# Patient Record
Sex: FEMALE | Race: WHITE | Employment: OTHER | URBAN - METROPOLITAN AREA
[De-identification: names, ages, dates, MRNs, and addresses within clinical notes are randomized per-mention and may not be internally consistent; named-entity substitution may affect disease eponyms.]

---

## 2017-02-01 NOTE — PROGRESS NOTES
[place]    Chief Complaint   Patient presents with     RECHECK       HPI:    Arpita Martinez is a 83 year old  (4/26/1933), who is being seen today for an episodic care visit at [facility] .     Today's concern is:     MS (multiple sclerosis) (H)  Traumatic ischemia of muscle, sequela  Osteoporosis  Frequent falls  Pain  Dementia with behavioral disturbance, unspecified dementia type     Met with patient today, seen earlier having cookies and coffee with facility residents in community room, very pleasant, WC bound due to MS, R hand 50%  strength, L hand 30%  strength, able to reposition self in WC, continent, uses call button for assist most of time for toileting, attempts to be independent place at slight risk for falling, no new complaints, overall stable cognitive status with slight decline in physical abilities.     ALLERGIES: Review of patient's allergies indicates no known allergies.  Past Medical, Surgical, Family and Social History reviewed and updated in [facility].    Current Outpatient Prescriptions   Medication Sig Dispense Refill     alendronate (FOSAMAX) 70 MG tablet Take 1 tablet (70 mg) by mouth once a week 4 tablet 11     sennosides (SENOKOT) 8.6 MG tablet Take 2 tablets by mouth 2 times daily as needed for constipation       acetaminophen (TYLENOL) 500 MG tablet Take 500-1,000 mg by mouth every 6 hours as needed for mild pain       donepezil (ARICEPT) 5 MG tablet Take 1 tablet (5 mg) by mouth daily 31 tablet PRN     Multiple Vitamins-Minerals (WOMENS ONE DAILY) TABS Take 1 tablet by mouth daily 31 tablet PRN       REVIEW OF SYSTEMS:  4 point ROS including Respiratory, CV, GI and , other than that noted in the HPI,  is negative    Physical Exam:  /80 mmHg  Pulse 83  Temp(Src) 99  F (37.2  C)  Resp 18  Wt 102 lb 12.8 oz (46.63 kg)  GENERAL APPEARANCE:  Alert, in no distress, thin, happy  ENT:  Mouth and posterior oropharynx normal, moist mucous membranes,  [place]  RESP:  lungs clear to auscultation, no respiratory distress, sats >90%  CV:  Palpation and auscultation of heart done, regular rate and rhythm  ABDOMEN:  no guarding or rebound, bowel sounds normal  M/S:   Gait and station abnormal requires WC for safety, difficult to transfer/requires assist, moderate strength upper extremities, hand contractures L>R from RA, fine motor coordination intact R hand  SKIN:  misc forearm and hand bruising  NEURO:   Examination of sensation by touch normal  PSYCH:  oriented to self, safe with supportive environment    Recent Labs:      CBC RESULTS:   Recent Labs   Lab Test 08/04/16 05/25/16   WBC  4.8  2.1   RBC  4.20  3.34   HGB  12.8  10.7*   HCT  38.9  32.5   MCV  93  97   MCH  30.5  32   MCHC  32.9  32.9   RDW  13.2  15.3   PLT  334  438       Last Basic Metabolic Panel:  Recent Labs   Lab Test 08/04/16 05/16/16   NA  132  136   POTASSIUM  4.1  3.5   CHLORIDE  97  108   NII  9.7  7.7   BUN  14  11   CR  0.6  0.51   GLC  113*  92           Assessment/Plan:  MS (multiple sclerosis) (H)  Traumatic ischemia of muscle, sequela  -chronic condition, requires assist for transfer, probable WC bound  -[facility] PT/OT to continue working with  -ordered electronic wheelchair, unsure of arrival date; [facility] has specs and F2F for already    Osteoporosis  Frequent falls  Pain  -mainly hands are affected  -appreciate therapy and ability to work with  -continues tylenol for pain control  -continues fosamax weekly  -facility to monitor    Dementia with behavioral disturbance, unspecified dementia type  -most recent SLUMs 18/30, moderate dementia  -continue on aricept 5mg  -no new behaviors or issues per staff except for falls      Electronically signed by  CAMILLA Duarte CNP          Documentation of Face-to-Face and Certification for Home Health Services     Patient: Arpita Martinez   YOB: 1933  MR Number: [id number]  Today's Date: 2/2/2017      If you do not hear  from Home Care and Hospice, or you would like to call to schedule, please call the referring place of service that your provider has listed below.  ______________________________________________________________________    Your provider has referred you to: [facility]: [facility] [phone]   [email or web address]    Extended Emergency Contact Information  Primary Emergency Contact: Mary Mary  Address: [address] [place] [address]           [place] [address] [facility]  Mobile Phone: [phone]  Relation: Daughter  Secondary Emergency Contact: Zachary Martinez   [place]  Mobile Phone: [phone]  Relation: Son    Patient Anticipated Discharge Date: PATRICIA   RN, PT, HHA to begin 24 - 48 hours after discharge.  PLEASE EVALUATE AND TREAT (Evaluation timeline is 24 - 48 hrs. Please call if there is need for a variance to this timeline).    REASON FOR REFERRAL: Assessment & Treatment: PT    ADDITIONAL SERVICES NEEDED: OT    OTHER PERTINENT INFORMATION: Patient was last seen by provider on 2/2/17 for    MS (multiple sclerosis) (H)  Traumatic ischemia of muscle, sequela  Osteoporosis  Frequent falls  Pain  Dementia with behavioral disturbance, unspecified dementia type    Current Outpatient Prescriptions:  alendronate (FOSAMAX) 70 MG tablet, Take 1 tablet (70 mg) by mouth once a week, Disp: 4 tablet, Rfl: 11  sennosides (SENOKOT) 8.6 MG tablet, Take 2 tablets by mouth 2 times daily as needed for constipation, Disp: , Rfl:   acetaminophen (TYLENOL) 500 MG tablet, Take 500-1,000 mg by mouth every 6 hours as needed for mild pain, Disp: , Rfl:   donepezil (ARICEPT) 5 MG tablet, Take 1 tablet (5 mg) by mouth daily, Disp: 31 tablet, Rfl: PRN  Multiple Vitamins-Minerals (WOMENS ONE DAILY) TABS, Take 1 tablet by mouth daily, Disp: 31 tablet, Rfl: PRN      Patient Active Problem List:     Fx Tibia/Fibula NOS-Closed, RIGHT     Status post total knee replacement      CARDIOVASCULAR SCREENING; LDL GOAL LESS THAN 160     Traumatic ischemia of muscle, sequela     Osteoporosis     Pain     Constipation, unspecified constipation type     Dementia with behavioral disturbance, unspecified dementia type     Hyponatremia     Anemia, unspecified type     Frequent falls     Dementia without behavioral disturbance, unspecified dementia type     MS (multiple sclerosis) (H)      Documentation of Face to Face and Certification for Home Health Services    I certify that patient, Arpita Martinez is under my care and that I, or a Nurse Practitioner or Physician's Assistant working with me, had a face-to-face encounter that meets the physician face-to-face encounter requirements with this patient on: 2/2/2017.    This encounter with the patient was in whole, or in part, for the following medical condition, which is the primary reason for Home Health Care: MS/weakness.    I certify that, based on my findings, the following services are medically necessary Home Health Services: Occupational Therapy and Physical Therapy    My clinical findings support the need for the above services because: Occupational Therapy Services are needed to assess and treat cognitive ability and address ADL safety due to impairment in muscle coordination, falls, risk, safety. and Physical Therapy Services are needed to assess and treat the following functional impairments: fine motor skills, ADL independence.    Further, I certify that my clinical findings support that this patient is homebound (i.e. absences from home require considerable and taxing effort and are for medical reasons or [employer] services or infrequently or of short duration when for other reasons) because: Requires assistance of another person or specialized equipment to access medical services because patient: Requires supervision of another for safe transfer..    Based on the above findings, I certify that this patient is confined to the home and needs  intermittent skilled nursing care, physical therapy and/or speech therapy.  The patient is under my care, and I have initiated the establishment of the plan of care.  This patient will be followed by a physician who will periodically review the plan of care.    Physician/Provider to provide follow up care: Tobi Jennings    [facility] certified Physician at time of discharge: Dr. Hailee Osborne  Electronic Physician Signature:   Date: 2/2/2017

## 2017-02-02 ENCOUNTER — ASSISTED LIVING VISIT (OUTPATIENT)
Dept: GERIATRICS | Facility: CLINIC | Age: 82
End: 2017-02-02
Payer: COMMERCIAL

## 2017-02-02 VITALS
SYSTOLIC BLOOD PRESSURE: 137 MMHG | RESPIRATION RATE: 18 BRPM | DIASTOLIC BLOOD PRESSURE: 80 MMHG | TEMPERATURE: 99 F | WEIGHT: 102.8 LBS | HEART RATE: 83 BPM

## 2017-02-02 DIAGNOSIS — T79.6XXS TRAUMATIC ISCHEMIA OF MUSCLE, SEQUELA: ICD-10-CM

## 2017-02-02 DIAGNOSIS — F03.91 DEMENTIA WITH BEHAVIORAL DISTURBANCE, UNSPECIFIED DEMENTIA TYPE: ICD-10-CM

## 2017-02-02 DIAGNOSIS — M81.0 OSTEOPOROSIS: ICD-10-CM

## 2017-02-02 DIAGNOSIS — G35 MS (MULTIPLE SCLEROSIS) (H): Primary | ICD-10-CM

## 2017-02-02 DIAGNOSIS — R29.6 FREQUENT FALLS: ICD-10-CM

## 2017-02-02 DIAGNOSIS — R52 PAIN: ICD-10-CM

## 2017-02-02 PROCEDURE — 99207 ZZC CDG-CORRECTLY CODED, REVIEWED AND AGREE: CPT | Performed by: NURSE PRACTITIONER

## 2017-04-06 ENCOUNTER — ASSISTED LIVING VISIT (OUTPATIENT)
Dept: GERIATRICS | Facility: CLINIC | Age: 82
End: 2017-04-06
Payer: COMMERCIAL

## 2017-04-06 DIAGNOSIS — R22.0 MOUTH SWELLING: ICD-10-CM

## 2017-04-06 DIAGNOSIS — K13.79 MOUTH PAIN: ICD-10-CM

## 2017-04-06 DIAGNOSIS — S09.93XS DENTAL INJURY, SEQUELA: Primary | ICD-10-CM

## 2017-04-06 RX ORDER — LOPERAMIDE HCL 2 MG
4 CAPSULE ORAL ONCE
COMMUNITY

## 2017-04-06 NOTE — PROGRESS NOTES
"[place]    Chief Complaint   Patient presents with     RECHECK       HPI:    Arpita Martinez is a 83 year old  (4/26/1933), who is being seen today for an episodic care visit at [facility] .       Today's concern is:  Dental injury, sequela  Mouth swelling  Mouth pain    Patient presented earlier this week with swelling and pain on L side of mouth and lower jaw, considered probable tissue infection due to dentition, started on cephalexin to prevent acute infection until could be seen by dentist, had appt on 4/5/17 with [facility] at the [place] location via family, S/p root canal, cephalexin was D/C'd in favor of clindamycin by dentist, current status is continued pain in L lower jaw although patient did go down for coffee/cookies as usual earlier today, no s/s dry socket,  overall stable with swelling decreased, intake appropriate, patient states \"I am going to live\".       ALLERGIES: Review of patient's allergies indicates no known allergies.  Past Medical, Surgical, Family and Social History reviewed and updated in [facility].    Current Outpatient Prescriptions   Medication Sig Dispense Refill     CLINDAMYCIN HCL PO Take 300 mg by mouth 4 times daily       loperamide (IMODIUM) 2 MG capsule Take 2 mg by mouth 3 times daily as needed for diarrhea       alendronate (FOSAMAX) 70 MG tablet Take 1 tablet (70 mg) by mouth once a week 4 tablet 11     sennosides (SENOKOT) 8.6 MG tablet Take 2 tablets by mouth 2 times daily as needed for constipation       acetaminophen (TYLENOL) 500 MG tablet Take 500-1,000 mg by mouth every 6 hours as needed for mild pain       donepezil (ARICEPT) 5 MG tablet Take 1 tablet (5 mg) by mouth daily 31 tablet PRN     Multiple Vitamins-Minerals (WOMENS ONE DAILY) TABS Take 1 tablet by mouth daily 31 tablet PRN     Medications reconciled to facility chart and changes were made to reflect current medications as identified as above med list. Below are " the changes that were made:   Medications stopped since last EPIC medication reconciliation:   There are no discontinued medications.    Medications started since last [facility] medication reconciliation:  Orders Placed This Encounter   Medications     CLINDAMYCIN HCL PO     Sig: Take 300 mg by mouth 4 times daily     loperamide (IMODIUM) 2 MG capsule     Sig: Take 2 mg by mouth 3 times daily as needed for diarrhea       REVIEW OF SYSTEMS:  4 point ROS including Respiratory, CV, GI and , other than that noted in the HPI,  is negative    Physical Exam:  There were no vitals taken for this visit.  GENERAL APPEARANCE:  Alert, in no distress, thin  ENT:  Mouth and posterior oropharynx normal, moist mucous membranes, normal hearing acuity  RESP:  lungs clear to auscultation , no respiratory distress  CV:  Palpation and auscultation of heart done , regular rate and rhythm, no murmur, rub, or gallop  ABDOMEN:  no guarding or rebound, bowel sounds normal  M/S:   Gait and station abnormal WC bound, weak  SKIN:  Inspection of skin and subcutaneous tissue baseline  NEURO:   Examination of sensation by touch normal  PSYCH:  oriented X 3    Recent Labs:      CBC RESULTS:   Recent Labs   Lab Test 08/04/16 05/25/16   WBC  4.8  2.1   RBC  4.20  3.34   HGB  12.8  10.7*   HCT  38.9  32.5   MCV  93  97   MCH  30.5  32   MCHC  32.9  32.9   RDW  13.2  15.3   PLT  334  438       Last Basic Metabolic Panel:  Recent Labs   Lab Test 08/04/16 05/16/16   NA  132  136   POTASSIUM  4.1  3.5   CHLORIDE  97  108   NII  9.7  7.7   BUN  14  11   CR  0.6  0.51   GLC  113*  92           Assessment/Plan:  Dental injury, sequela  Mouth swelling  Mouth pain  -Root canal on 4/5/17, started on clindamycin for infection prophylaxis  -patient seems to have tolerated procedure well  -has saline swish/spit as ordered  -to follow up with primary dentis on 4/14/17 for permanent restoration or crown    Orders:  No new orders at this time. Continue current plan of  care until status changes.    Electronically signed by  CAMILLA Duarte CNP

## 2017-05-26 ENCOUNTER — TRANSFERRED RECORDS (OUTPATIENT)
Dept: HEALTH INFORMATION MANAGEMENT | Facility: CLINIC | Age: 82
End: 2017-05-26

## 2017-05-31 NOTE — PROGRESS NOTES
[facility]  Chief Complaint   Patient presents with     Annual Comprehensive Nursing Home       HPI:    Arpita Martinez is a 84 year old  (4/26/1933), who is being seen today for an annual comprehensive visit at [facility] .     Today's concerns are:  Urinary tract infection, site unspecified  Nitrofurantion 100mg po BID started 5/30 x 5 days, until 6/4. Currently no s/s dysuria, afebrile.    Dementia with behavioral disturbance, unspecified dementia type  Increased behaviors, resistant to cares, refused to be changed when soiled, very opinionated. Current regimen Donepezil 5mg po qhs.     Impacted cerumen, unspecified laterality  Patient reported difficulty hearing, ears checked, copious amounts of firm cerumen blocking bilateral ear canals at >90%, removed today.    MS (multiple sclerosis) (H)  Requires transfer assist, still attends many facility activities including exercise daily, stable.    Screening for depression  Depression screen done: PHQ-2 Given screen score and clinical assessment patient is stable without any signs of depression and no futher interventions warrented at this time.      Screening for hypertension  Based on JNC-8 goals,  patients age of 84 year old, no presence of diabetes or CKD, and goals of care goal BP is  <140/90 mm Hg. patient is stable and continue without pharmacological invention with routine assessment.  BP readings range:  122/72  195/94  153/102  137/80  123/69  131/70       Osteoporosis  Current regimen Fosamax 70mg po q7days.       ALLERGIES: Review of patient's allergies indicates no known allergies.  PROBLEM LIST:  Patient Active Problem List   Diagnosis     Fx Tibia/Fibula NOS-Closed, RIGHT     Status post total knee replacement     CARDIOVASCULAR SCREENING; LDL GOAL LESS THAN 160     Traumatic ischemia of muscle, sequela     Osteoporosis     Pain     Constipation, unspecified constipation type     Dementia with behavioral disturbance, unspecified  dementia type     Hyponatremia     Anemia, unspecified type     Frequent falls     Dementia without behavioral disturbance, unspecified dementia type     MS (multiple sclerosis) (H)     PAST MEDICAL HISTORY:  has no past medical history on file.  PAST SURGICAL HISTORY:  has a past surgical history that includes TOTAL KNEE ARTHROPLASTY.  FAMILY HISTORY: family history is not on file.  SOCIAL HISTORY:    IMMUNIZATIONS:  Most Recent Immunizations   Administered Date(s) Administered     Influenza (High Dose) 3 valent vaccine 10/05/2016     Pneumococcal (PCV 13) 11/01/2016     Tdap (Adacel,Boostrix) 04/19/2011   Deferred Date(s) Deferred     Pneumococcal (PCV 13) 10/25/2016     Above immunizations pulled from [employer]. [facility] and facility records also reconciled. Outstanding information sent to  to update [employer].  Future immunizations needed:  yearly influenza per facility protocol  MEDICATIONS:  Current Outpatient Prescriptions   Medication Sig Dispense Refill     NITROFURANTOIN MONOHYD MACRO PO Take 100 mg by mouth 2 times daily       loperamide (IMODIUM) 2 MG capsule Take 2 mg by mouth 3 times daily as needed for diarrhea       alendronate (FOSAMAX) 70 MG tablet Take 1 tablet (70 mg) by mouth once a week 4 tablet 11     sennosides (SENOKOT) 8.6 MG tablet Take 2 tablets by mouth 2 times daily as needed for constipation       acetaminophen (TYLENOL) 500 MG tablet Take 500-1,000 mg by mouth every 6 hours as needed for mild pain       donepezil (ARICEPT) 5 MG tablet Take 1 tablet (5 mg) by mouth daily 31 tablet PRN     Multiple Vitamins-Minerals (WOMENS ONE DAILY) TABS Take 1 tablet by mouth daily 31 tablet PRN       Case Management:  I have reviewed the Assisted Living care plan, current immunizations and preventive care/cancer screening.. Future cancer screening is not clinically indicated secondary to age/goals of care.   Patient's desire to return to the community is present, but is not able  due to care needs .    Advance Directive Discussion:    I reviewed the current advanced directives as reflected in EPIC and the facility chart.  plan of Care. I reviewed the POLST, resigned, dated and sent to [facility].  I did not due to cognitive impairment review the advance directives with the resident.     Team Discussion:  I communicated with the appropriate disciplines involved with the Plan of Care:   Nursing    Patient Goal:  Patient's goal is family's/responsible party's goal for the patient is Full Code and cares.    Information reviewed:  Medications, vital signs, orders, and nursing notes.    ROS:  4 point ROS including Respiratory, CV, GI and , other than that noted in the HPI,  is negative    Exam:  /72  Pulse 91  Temp 98.7  F (37.1  C)  Resp 18  Wt 107 lb 3.2 oz (48.6 kg)  GENERAL APPEARANCE:  Alert, in no distress, appears healthy, oriented  ENT:  Mouth and posterior oropharynx normal, moist mucous membranes, [place]  RESP:  lungs clear to auscultation , no respiratory distress  CV:  Palpation and auscultation of heart done , regular rate and rhythm, no murmur, rub, or gallop, peripheral edema 1+ in bilateral lower legs  ABDOMEN:  no guarding or rebound, bowel sounds normal  M/S:   Gait and station abnormal requires assist transfer, electric WC  SKIN:  Inspection of skin and subcutaneous tissue baseline, random bruising and small scabs on arms and lower legs  NEURO:   Cranial nerves 2-12 are normal tested and grossly at patient's baseline, Examination of sensation by touch normal  PSYCH:  oriented to self/place, affect and mood normal       Lab/Diagnostic data:      CBC RESULTS:   Recent Labs   Lab Test 08/04/16 05/25/16   WBC  4.8  2.1   RBC  4.20  3.34   HGB  12.8  10.7*   HCT  38.9  32.5   MCV  93  97   MCH  30.5  32   MCHC  32.9  32.9   RDW  13.2  15.3   PLT  334  438       Last Basic Metabolic Panel:  Recent Labs   Lab Test 08/04/16 05/16/16   NA  132  136   POTASSIUM  4.1  3.5    CHLORIDE  97  108   NII  9.7  7.7   BUN  14  11   CR  0.6  0.51   GLC  113*  92           ASSESSMENT/PLAN  Urinary tract infection, site unspecified  -positive 10-50K Ecoli, was started on macrobid 100mg BID x5d through 6/4/17  -no current s/s dysuria, considering resolved with ABX use    Dementia with behavioral disturbance, unspecified dementia type  -chronic decline, SLUMs 2016 was 18/30, continues to take donepezil 5mg Qhs  -stable, would consider D/C of donepezil with status decline    Impacted cerumen, unspecified laterality  -bilateral ear gross amount of cerumen removed using lighted curette, almost 100% blockage  -hearing improved, will plan to recheck per patient request every 3-6 months    MS (multiple sclerosis) (H)  -chronic, stable, no medication control indicated  -using electric WC    Screening for depression  -PHQ-2 today 0/2, no s/s depression  -monitor, may develop with exacerbation of MS    Screening for hypertension  -SBP's have been in the 120-150 range, WNL for patient  -no medication intervention indicated with goal <150    Osteoporosis  -patient continues fosamax 70mg weekly and MVI  -continue until further notice      Orders:  No new orders at this time, continue current plan of care until status changes.       Electronically signed by:  CAMILLA Duarte [facility]

## 2017-06-01 ENCOUNTER — ASSISTED LIVING VISIT (OUTPATIENT)
Dept: GERIATRICS | Facility: CLINIC | Age: 82
End: 2017-06-01
Payer: COMMERCIAL

## 2017-06-01 VITALS
HEART RATE: 91 BPM | WEIGHT: 107.2 LBS | SYSTOLIC BLOOD PRESSURE: 122 MMHG | RESPIRATION RATE: 18 BRPM | DIASTOLIC BLOOD PRESSURE: 72 MMHG | TEMPERATURE: 98.7 F

## 2017-06-01 DIAGNOSIS — M81.0 OSTEOPOROSIS: ICD-10-CM

## 2017-06-01 DIAGNOSIS — H61.20 IMPACTED CERUMEN, UNSPECIFIED LATERALITY: ICD-10-CM

## 2017-06-01 DIAGNOSIS — Z13.6 SCREENING FOR HYPERTENSION: ICD-10-CM

## 2017-06-01 DIAGNOSIS — F03.91 DEMENTIA WITH BEHAVIORAL DISTURBANCE, UNSPECIFIED DEMENTIA TYPE: ICD-10-CM

## 2017-06-01 DIAGNOSIS — G35 MS (MULTIPLE SCLEROSIS) (H): ICD-10-CM

## 2017-06-01 DIAGNOSIS — N39.0 URINARY TRACT INFECTION, SITE UNSPECIFIED: Primary | ICD-10-CM

## 2017-06-01 DIAGNOSIS — Z13.31 SCREENING FOR DEPRESSION: ICD-10-CM

## 2017-06-01 PROCEDURE — 99207 ZZC CDG-DOWN CODE MED NECESSITY: CPT | Performed by: NURSE PRACTITIONER

## 2017-06-01 PROCEDURE — 69210 REMOVE IMPACTED EAR WAX UNI: CPT | Mod: 50 | Performed by: NURSE PRACTITIONER

## 2017-07-18 DIAGNOSIS — R52 PAIN: Primary | ICD-10-CM

## 2017-07-18 RX ORDER — ACETAMINOPHEN 500 MG
500-1000 TABLET ORAL EVERY 6 HOURS PRN
Qty: 60 TABLET | Refills: 98 | Status: SHIPPED | OUTPATIENT
Start: 2017-07-18 | End: 2017-08-08

## 2017-08-08 DIAGNOSIS — K59.01 SLOW TRANSIT CONSTIPATION: Primary | ICD-10-CM

## 2017-08-08 DIAGNOSIS — R52 PAIN: ICD-10-CM

## 2017-08-08 RX ORDER — SENNOSIDES 8.6 MG
2 TABLET ORAL 2 TIMES DAILY PRN
Qty: 124 TABLET | Refills: 12 | Status: SHIPPED | OUTPATIENT
Start: 2017-08-08 | End: 2022-03-28

## 2017-08-08 RX ORDER — ACETAMINOPHEN 500 MG
500-1000 TABLET ORAL EVERY 6 HOURS PRN
Qty: 248 TABLET | Refills: 12 | Status: SHIPPED | OUTPATIENT
Start: 2017-08-08 | End: 2022-03-28

## 2017-08-13 DIAGNOSIS — F02.80 LATE ONSET ALZHEIMER'S DISEASE WITHOUT BEHAVIORAL DISTURBANCE (H): ICD-10-CM

## 2017-08-13 DIAGNOSIS — Z71.89 ENCOUNTER FOR HERB AND VITAMIN SUPPLEMENT MANAGEMENT: ICD-10-CM

## 2017-08-13 DIAGNOSIS — G30.1 LATE ONSET ALZHEIMER'S DISEASE WITHOUT BEHAVIORAL DISTURBANCE (H): ICD-10-CM

## 2017-08-14 RX ORDER — DONEPEZIL HYDROCHLORIDE 5 MG/1
5 TABLET, FILM COATED ORAL DAILY
Qty: 31 TABLET | Status: SHIPPED | OUTPATIENT
Start: 2017-08-14

## 2017-08-31 ENCOUNTER — TRANSFERRED RECORDS (OUTPATIENT)
Dept: HEALTH INFORMATION MANAGEMENT | Facility: CLINIC | Age: 82
End: 2017-08-31

## 2017-08-31 ENCOUNTER — ASSISTED LIVING VISIT (OUTPATIENT)
Dept: GERIATRICS | Facility: CLINIC | Age: 82
End: 2017-08-31
Payer: COMMERCIAL

## 2017-08-31 VITALS
RESPIRATION RATE: 20 BRPM | HEART RATE: 108 BPM | SYSTOLIC BLOOD PRESSURE: 144 MMHG | DIASTOLIC BLOOD PRESSURE: 90 MMHG | TEMPERATURE: 101.6 F

## 2017-08-31 DIAGNOSIS — R05.9 COUGH: ICD-10-CM

## 2017-08-31 DIAGNOSIS — R11.10 VOMITING, INTRACTABILITY OF VOMITING NOT SPECIFIED, PRESENCE OF NAUSEA NOT SPECIFIED, UNSPECIFIED VOMITING TYPE: ICD-10-CM

## 2017-08-31 DIAGNOSIS — R51.9 HEADACHE, UNSPECIFIED HEADACHE TYPE: ICD-10-CM

## 2017-08-31 DIAGNOSIS — R50.9 FEVER, UNSPECIFIED: Primary | ICD-10-CM

## 2017-08-31 NOTE — PROGRESS NOTES
"[place] [facility]    Chief Complaint   Patient presents with     RECHECK       HPI:    Arpita Martinez is a 84 year old  (4/26/1933), who is being seen today for an episodic care visit at [facility] .  HPI information obtained from: facility chart records, facility staff, patient report and [facility] chart review.    Today's concern is:     Fever, unspecified  Headache, unspecified headache type  Vomiting, intractability of vomiting not specified, presence of nausea not specified, unspecified vomiting type  Cough     Patient reports 3 days fever 99.6-101.6, emesis x1x, mild headache, cough with semi-productive clearance, slight nasal discharge, states \"I am sick, staying in room so nobody else will get sick\", has been eating crackers and keeping medications down, pulmonary status bilateral basilar crackles, no distress, no acute concerns, presents stable except for above.    ALLERGIES: Review of patient's allergies indicates no known allergies.  Past Medical, Surgical, Family and Social History reviewed and updated in [facility].    Current Outpatient Prescriptions   Medication Sig Dispense Refill     donepezil (ARICEPT) 5 MG tablet Take 1 tablet (5 mg) by mouth daily 31 tablet PRN     Multiple Vitamins-Minerals (WOMENS ONE DAILY) TABS Take 1 tablet by mouth daily 31 tablet PRN     acetaminophen (TYLENOL) 500 MG tablet Take 1-2 tablets (500-1,000 mg) by mouth every 6 hours as needed for mild pain 248 tablet 12     sennosides (SENOKOT) 8.6 MG tablet Take 2 tablets by mouth 2 times daily as needed for constipation 124 tablet 12     loperamide (IMODIUM) 2 MG capsule Take 2 mg by mouth 3 times daily as needed for diarrhea       alendronate (FOSAMAX) 70 MG tablet Take 1 tablet (70 mg) by mouth once a week 4 tablet 11         REVIEW OF SYSTEMS:  4 point ROS including Respiratory, CV, GI and , other than that noted in the HPI,  is negative    Physical Exam:  /90  Pulse 108  Temp 101.6  F (38.7  C) " " Resp 20  GENERAL APPEARANCE:  Alert, in no distress, thin  ENT:  Mouth and posterior oropharynx normal, moist mucous membranes  RESP:  no respiratory distress, crackles bilateral bases  CV:  Palpation and auscultation of heart done , regular rate and rhythm, no murmur, rub, or gallop, peripheral edema scant+ in bilateral lower legs/ankles  ABDOMEN:  no guarding or rebound, bowel sounds normal  M/S:   Gait and station abnormal requires assist, using WC  SKIN:  Inspection of skin and subcutaneous tissue baseline  NEURO:   Examination of sensation by touch normal  PSYCH:  oriented X 3, affect and mood normal    Recent Labs:      CBC RESULTS:   Recent Labs   Lab Test 08/04/16 05/25/16   WBC  4.8  2.1   RBC  4.20  3.34   HGB  12.8  10.7*   HCT  38.9  32.5   MCV  93  97   MCH  30.5  32   MCHC  32.9  32.9   RDW  13.2  15.3   PLT  334  438       Last Basic Metabolic Panel:  Recent Labs   Lab Test 08/04/16 05/16/16   NA  132  136   POTASSIUM  4.1  3.5   CHLORIDE  97  108   NII  9.7  7.7   BUN  14  11   CR  0.6  0.51   GLC  113*  92               Assessment/Plan:  Fever, unspecified  Headache, unspecified headache type  Vomiting, intractability of vomiting not specified, presence of nausea not specified, unspecified vomiting type  Cough  -headache resolved  -emesis 1x resolved at this time  -continue acetaminophen PRN for fever/pain  -CXR stat today to r/o pneumonia with no acute findings, unremarkable, no pnuemothorax  -start mucinex 600mg BID x7d for sputum clearance  -instructed to maintain hydration, monitor urine with goal light yellow  -considering this viral \"cold\", to self-resolve in next 24-48 hours   -patient understands to contact facility staff if status is not improving      Electronically signed by  CAMILLA Duarte [facility]                    "

## 2017-10-10 DIAGNOSIS — I63.9 CEREBROVASCULAR ACCIDENT (CVA), UNSPECIFIED MECHANISM (H): Primary | ICD-10-CM

## 2017-10-19 ENCOUNTER — ASSISTED LIVING VISIT (OUTPATIENT)
Dept: GERIATRICS | Facility: CLINIC | Age: 82
End: 2017-10-19
Payer: COMMERCIAL

## 2017-10-19 VITALS
RESPIRATION RATE: 18 BRPM | WEIGHT: 107.4 LBS | HEART RATE: 87 BPM | TEMPERATURE: 97.9 F | DIASTOLIC BLOOD PRESSURE: 81 MMHG | SYSTOLIC BLOOD PRESSURE: 127 MMHG

## 2017-10-19 DIAGNOSIS — L98.9 ENCOUNTER FOR REMOVAL OF SKIN LESION: Primary | ICD-10-CM

## 2017-10-19 DIAGNOSIS — L98.9 SKIN LESION: ICD-10-CM

## 2017-10-19 PROCEDURE — 11311 SHAVE SKIN LESION 0.6-1.0 CM: CPT | Performed by: NURSE PRACTITIONER

## 2017-10-19 NOTE — PROGRESS NOTES
"[facility]    Chief Complaint   Patient presents with     RECHECK       HPI:    Arpita Martinez is a 84 year old  (4/26/1933), who is being seen today for an episodic care visit at [facility].    HPI information obtained from: facility chart records, facility staff, patient report and [facility] chart review.       Today's concern is:     Encounter for removal of skin lesion  Skin lesion   Patient reports having discomfort with mole on L mid-cheek, intermittent minor bleeding after washing and cares, per family has \"mole on face that is not looking good\", approximate dimensions 0.7cm diameter, protruding 0.6cm, no overt s/s cancerous lesion, patient adamant regarding removal and was waiting for my arrival at facility at 8am to request procedure, met with daughter and patient at 11am, all parties agree that neither biopsy nor dermatology appt is indicated and to remove, overall no indication to not remove at this time as patient is disturbed by growth.      REVIEW OF SYSTEMS:  4 point ROS including Respiratory, CV, GI and , other than that noted in the HPI,  is negative    /81  Pulse 87  Temp 97.9  F (36.6  C)  Resp 18  Wt 107 lb 6.4 oz (48.7 kg)  GENERAL APPEARANCE:  Alert, in no distress  Skin: as above in HPI    ASSESSMENT/PLAN:  Encounter for removal of skin lesion  Skin lesion  - EXC BENIGN SKIN LESION FACE/EARS 0.6-1.0 CM  -mole L facial cheek removed today: sterilized area, used forceps to pull mole out slightly for removal, #15 scalpel to remove mole, moderate bleeding from site controlled with gauze and pressure, place pressure dressing on site, patient tolerated procedure well, no residual bleeding, patient pleased with removal, followed up 1 hour afterwards with no blood present on dressing.  -facility nurse to change dressing daily with cleanse, bacitracin, cover bandaid Qd and PRN if soiled until healed.      Tobi Jennings, CAMILLA CNP  [facility]

## 2017-11-09 ENCOUNTER — DISCHARGE SUMMARY NURSING HOME (OUTPATIENT)
Dept: GERIATRICS | Facility: CLINIC | Age: 82
End: 2017-11-09
Payer: COMMERCIAL

## 2017-11-09 VITALS
DIASTOLIC BLOOD PRESSURE: 85 MMHG | HEART RATE: 92 BPM | RESPIRATION RATE: 18 BRPM | SYSTOLIC BLOOD PRESSURE: 142 MMHG | TEMPERATURE: 99 F | WEIGHT: 108.8 LBS

## 2017-11-09 DIAGNOSIS — E87.1 HYPONATREMIA: ICD-10-CM

## 2017-11-09 DIAGNOSIS — F03.91 DEMENTIA WITH BEHAVIORAL DISTURBANCE, UNSPECIFIED DEMENTIA TYPE: ICD-10-CM

## 2017-11-09 DIAGNOSIS — R29.6 FREQUENT FALLS: ICD-10-CM

## 2017-11-09 DIAGNOSIS — D64.9 ANEMIA, UNSPECIFIED TYPE: ICD-10-CM

## 2017-11-09 DIAGNOSIS — R52 PAIN: ICD-10-CM

## 2017-11-09 DIAGNOSIS — G35 MS (MULTIPLE SCLEROSIS) (H): Primary | ICD-10-CM

## 2017-11-09 DIAGNOSIS — M81.0 AGE-RELATED OSTEOPOROSIS WITHOUT CURRENT PATHOLOGICAL FRACTURE: ICD-10-CM

## 2017-11-09 NOTE — PROGRESS NOTES
[facility] DISCHARGE SUMMARY    PATIENT'S NAME: Arpita Martinez  YOB: 1933  MEDICAL RECORD NUMBER:  [id number]    PRIMARY CARE PROVIDER AND CLINIC RESPONSIBLE AFTER TRANSFER: Tobi Jennings [address] [facility] [address] / [facility] [address]     CODE STATUS/ADVANCE DIRECTIVES DISCUSSION:   CPR/Full code      No Known Allergies    TRANSFERRING PROVIDERS: CAMILLA Duarte CNP, Hailee Cuenca MD  DATE OF SNF ADMISSION:  June / 07 / 2016  DATE OF SNF (anticipated) DISCHARGE: November / 31 / 2017 (pending facility admittance)  DISCHARGE DISPOSITION: Unknown   Nursing Facility: [facility]    TCU [facility] on 8/4/16 - 8/5/16 for evaluation of a fall on 8/3/16 pm . Admitted to this facility for  medical management and nursing care. Arpita signed onto [facility]  on 8/9/16.     Condition on Discharge:  Declining.Age and Dx related.  Function:  Limited  Cognitive Scores: See [facility] for details  [facility] 2016 was 18/30  Equipment: wheelchair    DISCHARGE DIAGNOSIS:   1. MS (multiple sclerosis) (H)    2. Age-related osteoporosis without current pathological fracture    3. Pain    4. Dementia with behavioral disturbance, unspecified dementia type    5. Frequent falls    6. Hyponatremia    7. Anemia, unspecified type        HPI Nursing Facility Course:  HPI information obtained from: facility chart records, facility staff, patient report and [facility] chart review.       MS (multiple sclerosis) (H)  -chronic decline, using self-propelled EWC at this time  -requires assistance 1-2 persons for ADL assist and personal cares    Age-related osteoporosis without current pathological fracture  Pain  -of note, continues eliquis 5mg BID for anticoagulation for historical PE back in 2010 along with Fx; plan to continue  -has had previous Fx of R tib/fib closed in 2010  -continue MVI, acetaminophen PRN, fosamax 70mg weekly    Dementia with behavioral disturbance, unspecified dementia  type  Frequent falls  -patient is opinionated, difficult to redirect at times, admits to being stubborn  -continue aricept 5mg Qd, would consider D/C in future with continued cognitive decline and potential side effects    Hyponatremia  -Na level on 8/4/17 132, no s/s hyponatremia  -follow up clinically    Anemia, unspecified type   -Hgb on 8/4/16 was 12.8, no s/s anemia  -follow up clinically      PAST MEDICAL HISTORY:  has no past medical history on file.    DISCHARGE MEDICATIONS:  Current Outpatient Prescriptions   Medication Sig Dispense Refill     apixaban ANTICOAGULANT (ELIQUIS) 5 MG tablet Take 1 tablet (5 mg) by mouth 2 times daily 62 tablet 98     donepezil (ARICEPT) 5 MG tablet Take 1 tablet (5 mg) by mouth daily 31 tablet PRN     Multiple Vitamins-Minerals (WOMENS ONE DAILY) TABS Take 1 tablet by mouth daily 31 tablet PRN     acetaminophen (TYLENOL) 500 MG tablet Take 1-2 tablets (500-1,000 mg) by mouth every 6 hours as needed for mild pain 248 tablet 12     sennosides (SENOKOT) 8.6 MG tablet Take 2 tablets by mouth 2 times daily as needed for constipation 124 tablet 12     loperamide (IMODIUM) 2 MG capsule Take 2 mg by mouth 3 times daily as needed for diarrhea       alendronate (FOSAMAX) 70 MG tablet Take 1 tablet (70 mg) by mouth once a week 4 tablet 11       MEDICATION CHANGES/RATIONALE:   See EPIC for details.  Controlled medications sent with patient: not applicable/none     ROS:    4 point ROS including Respiratory, CV, GI and , other than that noted in the HPI,  is negative    Physical Exam:   Vitals: /85  Pulse 92  Temp 99  F (37.2  C)  Resp 18  Wt 108 lb 12.8 oz (49.4 kg)  BMI= There is no height or weight on file to calculate BMI.    GENERAL APPEARANCE:  in no distress, appears healthy, thin  ENT:  Mouth and posterior oropharynx normal, moist mucous membranes, [place]  RESP:  lungs clear to auscultation , no respiratory distress  CV:  regular rate and rhythm, no murmur, rub, or gallop,  no edema  ABDOMEN:  no guarding or rebound, bowel sounds normal  M/S:   Gait and station abnormal requires transfer assist, using EWC for mobility  SKIN:  Inspection of skin and subcutaneous tissue baseline  NEURO:   Examination of sensation by touch normal  PSYCH:  oriented to self, insight and judgement impaired, memory impaired     DISCHARGE PLAN:  per facility recommendations  Patient instructed to follow-up with:  PCP in 7 days      Current [facility] scheduled appointments:  No future appointments.    MTM referral needed and placed by this provider: No    Pending labs: NA    SNF labs     CBC RESULTS:   Recent Labs   Lab Test 08/04/16 05/25/16   WBC  4.8  2.1   RBC  4.20  3.34   HGB  12.8  10.7*   HCT  38.9  32.5   MCV  93  97   MCH  30.5  32   MCHC  32.9  32.9   RDW  13.2  15.3   PLT  334  438       Last Basic Metabolic Panel:  Recent Labs   Lab Test 08/04/16 05/16/16   NA  132  136   POTASSIUM  4.1  3.5   CHLORIDE  97  108   NII  9.7  7.7   BUN  14  11   CR  0.6  0.51   GLC  113*  92         Discharge Treatments:NA    TOTAL DISCHARGE TIME:   Greater than 30 minutes  Electronically signed by:  CAMILLA Duarte CNP  [facility]

## 2018-01-03 ENCOUNTER — RECORDS - HEALTHEAST (OUTPATIENT)
Dept: LAB | Facility: CLINIC | Age: 83
End: 2018-01-03

## 2018-01-03 LAB
FLUAV AG SPEC QL IA: ABNORMAL
FLUBV AG SPEC QL IA: ABNORMAL

## 2018-01-04 ENCOUNTER — RECORDS - HEALTHEAST (OUTPATIENT)
Dept: LAB | Facility: CLINIC | Age: 83
End: 2018-01-04

## 2018-01-04 LAB
ANION GAP SERPL CALCULATED.3IONS-SCNC: 11 MMOL/L (ref 5–18)
BUN SERPL-MCNC: 12 MG/DL (ref 8–28)
CALCIUM SERPL-MCNC: 8.8 MG/DL (ref 8.5–10.5)
CHLORIDE BLD-SCNC: 98 MMOL/L (ref 98–107)
CO2 SERPL-SCNC: 23 MMOL/L (ref 22–31)
CREAT SERPL-MCNC: 0.59 MG/DL (ref 0.6–1.1)
ERYTHROCYTE [DISTWIDTH] IN BLOOD BY AUTOMATED COUNT: 14.7 % (ref 11–14.5)
GFR SERPL CREATININE-BSD FRML MDRD: >60 ML/MIN/1.73M2
GLUCOSE BLD-MCNC: 180 MG/DL (ref 70–125)
HCT VFR BLD AUTO: 40.6 % (ref 35–47)
HGB BLD-MCNC: 13.8 G/DL (ref 12–16)
MCH RBC QN AUTO: 32.2 PG (ref 27–34)
MCHC RBC AUTO-ENTMCNC: 34 G/DL (ref 32–36)
MCV RBC AUTO: 95 FL (ref 80–100)
PLATELET # BLD AUTO: 259 THOU/UL (ref 140–440)
PMV BLD AUTO: 9.2 FL (ref 8.5–12.5)
POTASSIUM BLD-SCNC: 4.1 MMOL/L (ref 3.5–5)
RBC # BLD AUTO: 4.28 MILL/UL (ref 3.8–5.4)
SODIUM SERPL-SCNC: 132 MMOL/L (ref 136–145)
WBC: 3.8 THOU/UL (ref 4–11)

## 2018-12-29 ENCOUNTER — RECORDS - HEALTHEAST (OUTPATIENT)
Dept: LAB | Facility: CLINIC | Age: 83
End: 2018-12-29

## 2018-12-29 LAB
ANION GAP SERPL CALCULATED.3IONS-SCNC: 9 MMOL/L (ref 5–18)
BUN SERPL-MCNC: 22 MG/DL (ref 8–28)
CALCIUM SERPL-MCNC: 9.2 MG/DL (ref 8.5–10.5)
CHLORIDE BLD-SCNC: 101 MMOL/L (ref 98–107)
CO2 SERPL-SCNC: 28 MMOL/L (ref 22–31)
CREAT SERPL-MCNC: 0.62 MG/DL (ref 0.6–1.1)
ERYTHROCYTE [DISTWIDTH] IN BLOOD BY AUTOMATED COUNT: 13.8 % (ref 11–14.5)
GFR SERPL CREATININE-BSD FRML MDRD: >60 ML/MIN/1.73M2
GLUCOSE BLD-MCNC: 214 MG/DL (ref 70–125)
HCT VFR BLD AUTO: 44.4 % (ref 35–47)
HGB BLD-MCNC: 13.9 G/DL (ref 12–16)
MCH RBC QN AUTO: 30.6 PG (ref 27–34)
MCHC RBC AUTO-ENTMCNC: 31.3 G/DL (ref 32–36)
MCV RBC AUTO: 98 FL (ref 80–100)
PLATELET # BLD AUTO: 266 THOU/UL (ref 140–440)
PMV BLD AUTO: 9.2 FL (ref 8.5–12.5)
POTASSIUM BLD-SCNC: 3.6 MMOL/L (ref 3.5–5)
RBC # BLD AUTO: 4.54 MILL/UL (ref 3.8–5.4)
SODIUM SERPL-SCNC: 138 MMOL/L (ref 136–145)
WBC: 5.4 THOU/UL (ref 4–11)

## 2018-12-31 ENCOUNTER — RECORDS - HEALTHEAST (OUTPATIENT)
Dept: LAB | Facility: CLINIC | Age: 83
End: 2018-12-31

## 2018-12-31 LAB
ALBUMIN UR-MCNC: ABNORMAL MG/DL
AMORPH CRY #/AREA URNS HPF: ABNORMAL /[HPF]
APPEARANCE UR: ABNORMAL
BACTERIA #/AREA URNS HPF: ABNORMAL HPF
BILIRUB UR QL STRIP: NEGATIVE
COLOR UR AUTO: ABNORMAL
GLUCOSE UR STRIP-MCNC: NEGATIVE MG/DL
HGB UR QL STRIP: NEGATIVE
KETONES UR STRIP-MCNC: ABNORMAL MG/DL
LEUKOCYTE ESTERASE UR QL STRIP: ABNORMAL
NITRATE UR QL: NEGATIVE
PH UR STRIP: 8 [PH] (ref 4.5–8)
RBC #/AREA URNS AUTO: ABNORMAL HPF
SP GR UR STRIP: 1.02 (ref 1–1.03)
SQUAMOUS #/AREA URNS AUTO: ABNORMAL LPF
TRI-PHOS CRY #/AREA URNS HPF: PRESENT /[HPF]
UROBILINOGEN UR STRIP-ACNC: ABNORMAL
WBC #/AREA URNS AUTO: ABNORMAL HPF

## 2019-01-01 LAB — BACTERIA SPEC CULT: NORMAL

## 2019-07-30 ENCOUNTER — RECORDS - HEALTHEAST (OUTPATIENT)
Dept: LAB | Facility: CLINIC | Age: 84
End: 2019-07-30

## 2019-07-30 LAB
ANION GAP SERPL CALCULATED.3IONS-SCNC: 7 MMOL/L (ref 5–18)
BUN SERPL-MCNC: 15 MG/DL (ref 8–28)
CALCIUM SERPL-MCNC: 9.1 MG/DL (ref 8.5–10.5)
CHLORIDE BLD-SCNC: 102 MMOL/L (ref 98–107)
CO2 SERPL-SCNC: 27 MMOL/L (ref 22–31)
CREAT SERPL-MCNC: 0.57 MG/DL (ref 0.6–1.1)
ERYTHROCYTE [DISTWIDTH] IN BLOOD BY AUTOMATED COUNT: 14.5 % (ref 11–14.5)
GFR SERPL CREATININE-BSD FRML MDRD: >60 ML/MIN/1.73M2
GLUCOSE BLD-MCNC: 144 MG/DL (ref 70–125)
HCT VFR BLD AUTO: 42.6 % (ref 35–47)
HGB BLD-MCNC: 13.5 G/DL (ref 12–16)
MCH RBC QN AUTO: 31.3 PG (ref 27–34)
MCHC RBC AUTO-ENTMCNC: 31.7 G/DL (ref 32–36)
MCV RBC AUTO: 99 FL (ref 80–100)
PLATELET # BLD AUTO: 306 THOU/UL (ref 140–440)
PMV BLD AUTO: 9.4 FL (ref 8.5–12.5)
POTASSIUM BLD-SCNC: 3.9 MMOL/L (ref 3.5–5)
RBC # BLD AUTO: 4.32 MILL/UL (ref 3.8–5.4)
SODIUM SERPL-SCNC: 136 MMOL/L (ref 136–145)
WBC: 5.4 THOU/UL (ref 4–11)

## 2019-08-30 ENCOUNTER — RECORDS - HEALTHEAST (OUTPATIENT)
Dept: LAB | Facility: CLINIC | Age: 84
End: 2019-08-30

## 2019-08-30 LAB
ANION GAP SERPL CALCULATED.3IONS-SCNC: 9 MMOL/L (ref 5–18)
BUN SERPL-MCNC: 16 MG/DL (ref 8–28)
CALCIUM SERPL-MCNC: 9.6 MG/DL (ref 8.5–10.5)
CHLORIDE BLD-SCNC: 101 MMOL/L (ref 98–107)
CO2 SERPL-SCNC: 27 MMOL/L (ref 22–31)
CREAT SERPL-MCNC: 0.63 MG/DL (ref 0.6–1.1)
ERYTHROCYTE [DISTWIDTH] IN BLOOD BY AUTOMATED COUNT: 14.1 % (ref 11–14.5)
GFR SERPL CREATININE-BSD FRML MDRD: >60 ML/MIN/1.73M2
GLUCOSE BLD-MCNC: 93 MG/DL (ref 70–125)
HCT VFR BLD AUTO: 45.9 % (ref 35–47)
HGB BLD-MCNC: 14.6 G/DL (ref 12–16)
MCH RBC QN AUTO: 30.9 PG (ref 27–34)
MCHC RBC AUTO-ENTMCNC: 31.8 G/DL (ref 32–36)
MCV RBC AUTO: 97 FL (ref 80–100)
PLATELET # BLD AUTO: 336 THOU/UL (ref 140–440)
PMV BLD AUTO: 9.2 FL (ref 8.5–12.5)
POTASSIUM BLD-SCNC: 4.3 MMOL/L (ref 3.5–5)
RBC # BLD AUTO: 4.73 MILL/UL (ref 3.8–5.4)
SODIUM SERPL-SCNC: 137 MMOL/L (ref 136–145)
TSH SERPL DL<=0.005 MIU/L-ACNC: 2.01 UIU/ML (ref 0.3–5)
VIT B12 SERPL-MCNC: 743 PG/ML (ref 213–816)
WBC: 4.6 THOU/UL (ref 4–11)

## 2021-02-09 ENCOUNTER — RECORDS - HEALTHEAST (OUTPATIENT)
Dept: LAB | Facility: CLINIC | Age: 86
End: 2021-02-09

## 2021-02-09 LAB
SARS-COV-2 PCR COMMENT: NORMAL
SARS-COV-2 RNA SPEC QL NAA+PROBE: NEGATIVE
SARS-COV-2 VIRUS SPECIMEN SOURCE: NORMAL

## 2021-04-14 ENCOUNTER — RECORDS - HEALTHEAST (OUTPATIENT)
Dept: LAB | Facility: CLINIC | Age: 86
End: 2021-04-14

## 2021-09-22 ENCOUNTER — LAB REQUISITION (OUTPATIENT)
Dept: LAB | Facility: CLINIC | Age: 86
End: 2021-09-22
Payer: COMMERCIAL

## 2021-09-22 DIAGNOSIS — Z03.818 ENCOUNTER FOR OBSERVATION FOR SUSPECTED EXPOSURE TO OTHER BIOLOGICAL AGENTS RULED OUT: ICD-10-CM

## 2021-09-23 PROCEDURE — U0003 INFECTIOUS AGENT DETECTION BY NUCLEIC ACID (DNA OR RNA); SEVERE ACUTE RESPIRATORY SYNDROME CORONAVIRUS 2 (SARS-COV-2) (CORONAVIRUS DISEASE [COVID-19]), AMPLIFIED PROBE TECHNIQUE, MAKING USE OF HIGH THROUGHPUT TECHNOLOGIES AS DESCRIBED BY CMS-2020-01-R: HCPCS | Mod: ORL | Performed by: INTERNAL MEDICINE

## 2021-09-25 LAB — SARS-COV-2 RNA RESP QL NAA+PROBE: NOT DETECTED

## 2021-09-27 ENCOUNTER — LAB REQUISITION (OUTPATIENT)
Dept: LAB | Facility: CLINIC | Age: 86
End: 2021-09-27
Payer: COMMERCIAL

## 2021-09-27 DIAGNOSIS — Z03.818 ENCOUNTER FOR OBSERVATION FOR SUSPECTED EXPOSURE TO OTHER BIOLOGICAL AGENTS RULED OUT: ICD-10-CM

## 2021-10-07 PROCEDURE — U0003 INFECTIOUS AGENT DETECTION BY NUCLEIC ACID (DNA OR RNA); SEVERE ACUTE RESPIRATORY SYNDROME CORONAVIRUS 2 (SARS-COV-2) (CORONAVIRUS DISEASE [COVID-19]), AMPLIFIED PROBE TECHNIQUE, MAKING USE OF HIGH THROUGHPUT TECHNOLOGIES AS DESCRIBED BY CMS-2020-01-R: HCPCS | Mod: ORL | Performed by: INTERNAL MEDICINE

## 2021-10-10 LAB — SARS-COV-2 RNA RESP QL NAA+PROBE: NOT DETECTED

## 2021-10-11 ENCOUNTER — LAB REQUISITION (OUTPATIENT)
Dept: LAB | Facility: CLINIC | Age: 86
End: 2021-10-11
Payer: COMMERCIAL

## 2021-10-11 DIAGNOSIS — Z03.818 ENCOUNTER FOR OBSERVATION FOR SUSPECTED EXPOSURE TO OTHER BIOLOGICAL AGENTS RULED OUT: ICD-10-CM

## 2021-10-12 PROCEDURE — U0003 INFECTIOUS AGENT DETECTION BY NUCLEIC ACID (DNA OR RNA); SEVERE ACUTE RESPIRATORY SYNDROME CORONAVIRUS 2 (SARS-COV-2) (CORONAVIRUS DISEASE [COVID-19]), AMPLIFIED PROBE TECHNIQUE, MAKING USE OF HIGH THROUGHPUT TECHNOLOGIES AS DESCRIBED BY CMS-2020-01-R: HCPCS | Mod: ORL | Performed by: INTERNAL MEDICINE

## 2021-10-15 ENCOUNTER — LAB REQUISITION (OUTPATIENT)
Dept: LAB | Facility: CLINIC | Age: 86
End: 2021-10-15
Payer: COMMERCIAL

## 2021-10-15 DIAGNOSIS — Z03.818 ENCOUNTER FOR OBSERVATION FOR SUSPECTED EXPOSURE TO OTHER BIOLOGICAL AGENTS RULED OUT: ICD-10-CM

## 2021-10-16 LAB — SARS-COV-2 RNA RESP QL NAA+PROBE: NOT DETECTED

## 2021-10-22 ENCOUNTER — LAB REQUISITION (OUTPATIENT)
Dept: LAB | Facility: CLINIC | Age: 86
End: 2021-10-22
Payer: COMMERCIAL

## 2021-10-22 DIAGNOSIS — Z03.818 ENCOUNTER FOR OBSERVATION FOR SUSPECTED EXPOSURE TO OTHER BIOLOGICAL AGENTS RULED OUT: ICD-10-CM

## 2021-10-27 PROCEDURE — U0003 INFECTIOUS AGENT DETECTION BY NUCLEIC ACID (DNA OR RNA); SEVERE ACUTE RESPIRATORY SYNDROME CORONAVIRUS 2 (SARS-COV-2) (CORONAVIRUS DISEASE [COVID-19]), AMPLIFIED PROBE TECHNIQUE, MAKING USE OF HIGH THROUGHPUT TECHNOLOGIES AS DESCRIBED BY CMS-2020-01-R: HCPCS | Mod: ORL | Performed by: INTERNAL MEDICINE

## 2021-10-29 LAB — SARS-COV-2 RNA RESP QL NAA+PROBE: NOT DETECTED

## 2021-11-30 ENCOUNTER — LAB REQUISITION (OUTPATIENT)
Dept: LAB | Facility: CLINIC | Age: 86
End: 2021-11-30
Payer: COMMERCIAL

## 2021-11-30 DIAGNOSIS — Z03.818 ENCOUNTER FOR OBSERVATION FOR SUSPECTED EXPOSURE TO OTHER BIOLOGICAL AGENTS RULED OUT: ICD-10-CM

## 2021-12-01 PROCEDURE — U0005 INFEC AGEN DETEC AMPLI PROBE: HCPCS | Mod: ORL | Performed by: INTERNAL MEDICINE

## 2021-12-02 LAB — SARS-COV-2 RNA RESP QL NAA+PROBE: NEGATIVE

## 2021-12-07 ENCOUNTER — LAB REQUISITION (OUTPATIENT)
Dept: LAB | Facility: CLINIC | Age: 86
End: 2021-12-07
Payer: COMMERCIAL

## 2021-12-07 DIAGNOSIS — Z03.818 ENCOUNTER FOR OBSERVATION FOR SUSPECTED EXPOSURE TO OTHER BIOLOGICAL AGENTS RULED OUT: ICD-10-CM

## 2021-12-08 PROCEDURE — U0005 INFEC AGEN DETEC AMPLI PROBE: HCPCS | Mod: ORL | Performed by: INTERNAL MEDICINE

## 2021-12-09 LAB — SARS-COV-2 RNA RESP QL NAA+PROBE: NEGATIVE

## 2021-12-13 ENCOUNTER — LAB REQUISITION (OUTPATIENT)
Dept: LAB | Facility: CLINIC | Age: 86
End: 2021-12-13
Payer: COMMERCIAL

## 2021-12-13 DIAGNOSIS — Z03.818 ENCOUNTER FOR OBSERVATION FOR SUSPECTED EXPOSURE TO OTHER BIOLOGICAL AGENTS RULED OUT: ICD-10-CM

## 2021-12-15 PROCEDURE — U0005 INFEC AGEN DETEC AMPLI PROBE: HCPCS | Mod: ORL | Performed by: INTERNAL MEDICINE

## 2021-12-16 LAB — SARS-COV-2 RNA RESP QL NAA+PROBE: NEGATIVE

## 2021-12-20 ENCOUNTER — LAB REQUISITION (OUTPATIENT)
Dept: LAB | Facility: CLINIC | Age: 86
End: 2021-12-20
Payer: COMMERCIAL

## 2021-12-20 DIAGNOSIS — Z03.818 ENCOUNTER FOR OBSERVATION FOR SUSPECTED EXPOSURE TO OTHER BIOLOGICAL AGENTS RULED OUT: ICD-10-CM

## 2021-12-21 PROCEDURE — U0003 INFECTIOUS AGENT DETECTION BY NUCLEIC ACID (DNA OR RNA); SEVERE ACUTE RESPIRATORY SYNDROME CORONAVIRUS 2 (SARS-COV-2) (CORONAVIRUS DISEASE [COVID-19]), AMPLIFIED PROBE TECHNIQUE, MAKING USE OF HIGH THROUGHPUT TECHNOLOGIES AS DESCRIBED BY CMS-2020-01-R: HCPCS | Mod: ORL | Performed by: INTERNAL MEDICINE

## 2021-12-22 LAB — SARS-COV-2 RNA RESP QL NAA+PROBE: NEGATIVE

## 2022-01-27 ENCOUNTER — LAB REQUISITION (OUTPATIENT)
Dept: LAB | Facility: CLINIC | Age: 87
End: 2022-01-27
Payer: COMMERCIAL

## 2022-01-27 DIAGNOSIS — Z03.818 ENCOUNTER FOR OBSERVATION FOR SUSPECTED EXPOSURE TO OTHER BIOLOGICAL AGENTS RULED OUT: ICD-10-CM

## 2022-01-28 PROCEDURE — U0005 INFEC AGEN DETEC AMPLI PROBE: HCPCS | Mod: ORL | Performed by: INTERNAL MEDICINE

## 2022-01-30 LAB — SARS-COV-2 RNA RESP QL NAA+PROBE: NEGATIVE

## 2022-02-02 PROCEDURE — U0003 INFECTIOUS AGENT DETECTION BY NUCLEIC ACID (DNA OR RNA); SEVERE ACUTE RESPIRATORY SYNDROME CORONAVIRUS 2 (SARS-COV-2) (CORONAVIRUS DISEASE [COVID-19]), AMPLIFIED PROBE TECHNIQUE, MAKING USE OF HIGH THROUGHPUT TECHNOLOGIES AS DESCRIBED BY CMS-2020-01-R: HCPCS | Mod: ORL | Performed by: INTERNAL MEDICINE

## 2022-02-03 LAB — SARS-COV-2 RNA RESP QL NAA+PROBE: NOT DETECTED

## 2022-02-04 ENCOUNTER — LAB REQUISITION (OUTPATIENT)
Dept: LAB | Facility: CLINIC | Age: 87
End: 2022-02-04
Payer: COMMERCIAL

## 2022-02-04 DIAGNOSIS — Z03.818 ENCOUNTER FOR OBSERVATION FOR SUSPECTED EXPOSURE TO OTHER BIOLOGICAL AGENTS RULED OUT: ICD-10-CM

## 2022-02-09 PROCEDURE — U0005 INFEC AGEN DETEC AMPLI PROBE: HCPCS | Mod: ORL | Performed by: INTERNAL MEDICINE

## 2022-02-10 LAB — SARS-COV-2 RNA RESP QL NAA+PROBE: NOT DETECTED

## 2022-02-21 ENCOUNTER — LAB REQUISITION (OUTPATIENT)
Dept: LAB | Facility: CLINIC | Age: 87
End: 2022-02-21
Payer: COMMERCIAL

## 2022-02-21 DIAGNOSIS — F03.90 UNSPECIFIED DEMENTIA WITHOUT BEHAVIORAL DISTURBANCE: ICD-10-CM

## 2022-02-24 LAB
ALBUMIN SERPL-MCNC: 3.2 G/DL (ref 3.5–5)
ALP SERPL-CCNC: 97 U/L (ref 45–120)
ALT SERPL W P-5'-P-CCNC: 10 U/L (ref 0–45)
ANION GAP SERPL CALCULATED.3IONS-SCNC: 11 MMOL/L (ref 5–18)
AST SERPL W P-5'-P-CCNC: 17 U/L (ref 0–40)
BASOPHILS # BLD AUTO: 0 10E3/UL (ref 0–0.2)
BASOPHILS NFR BLD AUTO: 1 %
BILIRUB SERPL-MCNC: 0.8 MG/DL (ref 0–1)
BUN SERPL-MCNC: 7 MG/DL (ref 8–28)
CALCIUM SERPL-MCNC: 9.1 MG/DL (ref 8.5–10.5)
CHLORIDE BLD-SCNC: 99 MMOL/L (ref 98–107)
CO2 SERPL-SCNC: 25 MMOL/L (ref 22–31)
CREAT SERPL-MCNC: 0.62 MG/DL (ref 0.6–1.1)
EOSINOPHIL # BLD AUTO: 0 10E3/UL (ref 0–0.7)
EOSINOPHIL NFR BLD AUTO: 1 %
ERYTHROCYTE [DISTWIDTH] IN BLOOD BY AUTOMATED COUNT: 13.8 % (ref 10–15)
GFR SERPL CREATININE-BSD FRML MDRD: 85 ML/MIN/1.73M2
GLUCOSE BLD-MCNC: 131 MG/DL (ref 70–125)
HCT VFR BLD AUTO: 45.9 % (ref 35–47)
HGB BLD-MCNC: 15.2 G/DL (ref 11.7–15.7)
IMM GRANULOCYTES # BLD: 0 10E3/UL
IMM GRANULOCYTES NFR BLD: 1 %
LYMPHOCYTES # BLD AUTO: 1 10E3/UL (ref 0.8–5.3)
LYMPHOCYTES NFR BLD AUTO: 25 %
MCH RBC QN AUTO: 31.5 PG (ref 26.5–33)
MCHC RBC AUTO-ENTMCNC: 33.1 G/DL (ref 31.5–36.5)
MCV RBC AUTO: 95 FL (ref 78–100)
MONOCYTES # BLD AUTO: 0.2 10E3/UL (ref 0–1.3)
MONOCYTES NFR BLD AUTO: 5 %
NEUTROPHILS # BLD AUTO: 2.8 10E3/UL (ref 1.6–8.3)
NEUTROPHILS NFR BLD AUTO: 67 %
NRBC # BLD AUTO: 0 10E3/UL
NRBC BLD AUTO-RTO: 0 /100
PLATELET # BLD AUTO: 307 10E3/UL (ref 150–450)
POTASSIUM BLD-SCNC: 4 MMOL/L (ref 3.5–5)
PROT SERPL-MCNC: 6.6 G/DL (ref 6–8)
RBC # BLD AUTO: 4.83 10E6/UL (ref 3.8–5.2)
SODIUM SERPL-SCNC: 135 MMOL/L (ref 136–145)
TSH SERPL DL<=0.005 MIU/L-ACNC: 1.55 UIU/ML (ref 0.3–5)
VIT B12 SERPL-MCNC: 450 PG/ML (ref 213–816)
WBC # BLD AUTO: 4.1 10E3/UL (ref 4–11)

## 2022-02-24 PROCEDURE — P9603 ONE-WAY ALLOW PRORATED MILES: HCPCS | Mod: ORL | Performed by: INTERNAL MEDICINE

## 2022-02-24 PROCEDURE — 36415 COLL VENOUS BLD VENIPUNCTURE: CPT | Mod: ORL | Performed by: INTERNAL MEDICINE

## 2022-02-24 PROCEDURE — 80053 COMPREHEN METABOLIC PANEL: CPT | Mod: ORL | Performed by: INTERNAL MEDICINE

## 2022-02-24 PROCEDURE — 82607 VITAMIN B-12: CPT | Mod: ORL | Performed by: INTERNAL MEDICINE

## 2022-02-24 PROCEDURE — 85025 COMPLETE CBC W/AUTO DIFF WBC: CPT | Mod: ORL | Performed by: INTERNAL MEDICINE

## 2022-02-24 PROCEDURE — 84443 ASSAY THYROID STIM HORMONE: CPT | Mod: ORL | Performed by: INTERNAL MEDICINE

## 2022-03-28 ENCOUNTER — HOSPITAL ENCOUNTER (EMERGENCY)
Facility: CLINIC | Age: 87
Discharge: HOME | End: 2022-03-28
Attending: NURSE PRACTITIONER | Admitting: NURSE PRACTITIONER
Payer: COMMERCIAL

## 2022-03-28 VITALS
RESPIRATION RATE: 14 BRPM | SYSTOLIC BLOOD PRESSURE: 116 MMHG | HEART RATE: 95 BPM | DIASTOLIC BLOOD PRESSURE: 65 MMHG | TEMPERATURE: 98.3 F | OXYGEN SATURATION: 90 % | WEIGHT: 102 LBS

## 2022-03-28 DIAGNOSIS — S81.811A SKIN TEAR OF RIGHT LOWER LEG WITHOUT COMPLICATION, INITIAL ENCOUNTER: ICD-10-CM

## 2022-03-28 PROCEDURE — 99283 EMERGENCY DEPT VISIT LOW MDM: CPT

## 2022-03-28 PROCEDURE — 99282 EMERGENCY DEPT VISIT SF MDM: CPT | Performed by: NURSE PRACTITIONER

## 2022-03-28 RX ORDER — ACETAMINOPHEN 325 MG/1
650 TABLET ORAL EVERY 6 HOURS PRN
COMMUNITY

## 2022-03-28 RX ORDER — GINSENG 100 MG
CAPSULE ORAL PRN
COMMUNITY

## 2022-03-28 RX ORDER — LOPERAMIDE HYDROCHLORIDE 1 MG/5ML
SOLUTION ORAL
COMMUNITY

## 2022-03-28 RX ORDER — ALUMINA, MAGNESIA, AND SIMETHICONE 2400; 2400; 240 MG/30ML; MG/30ML; MG/30ML
30 SUSPENSION ORAL 4 TIMES DAILY PRN
COMMUNITY

## 2022-03-28 RX ORDER — BISACODYL 10 MG
10 SUPPOSITORY, RECTAL RECTAL DAILY PRN
COMMUNITY

## 2022-03-28 RX ORDER — CLOPIDOGREL BISULFATE 75 MG/1
75 TABLET ORAL DAILY
COMMUNITY

## 2022-03-28 NOTE — ED PROVIDER NOTES
History     Chief Complaint   Patient presents with     Laceration     right leg     HPI  Arpita Martinez is a 88 year old female with past medical history of dementia, MS, osteoporosis, frequent falls, on Plavix, currently living in assisted living, wheelchair-bound who presents to the emergency room via EMS with injury to right lower leg with bleeding.  Per patient and daughter who is present in the room there is no known injury.  Patient was playing bingo and watching TV at the assisted living when one of the other residents noted bleeding on the pant leg.  Patient had no knowledge of injury.  Daughter reports she is a Jesus lift to transfer from bed to wheelchair.  Daughter reports that per staff the current right lower leg injury was not present this morning with transfer time.  Patient reports tenderness at the site of the injury.    Patient reports feeling well otherwise and denies any fever, mental confusion, chest pain, shortness of breath, abdominal pain, nausea or vomiting.    Allergies:  No Known Allergies    Problem List:    Patient Active Problem List    Diagnosis Date Noted     Dementia without behavioral disturbance, unspecified dementia type (H) 12/15/2016     Priority: Medium     MS (multiple sclerosis) (H) 12/15/2016     Priority: Medium     Traumatic ischemia of muscle, sequela 08/11/2016     Priority: Medium     Osteoporosis 08/11/2016     Priority: Medium     Pain 08/11/2016     Priority: Medium     Constipation, unspecified constipation type 08/11/2016     Priority: Medium     Dementia with behavioral disturbance, unspecified dementia type (H) 08/11/2016     Priority: Medium     Hyponatremia 08/11/2016     Priority: Medium     Anemia, unspecified type 08/11/2016     Priority: Medium     Frequent falls 08/11/2016     Priority: Medium     CARDIOVASCULAR SCREENING; LDL GOAL LESS THAN 160 10/31/2010     Priority: Medium     Fx Tibia/Fibula NOS-Closed, RIGHT 05/03/2010     Priority: Medium      right       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    Past Surgical History:    Past Surgical History:   Procedure Laterality Date     ZZC TOTAL KNEE ARTHROPLASTY         Family History:    No family history on file.    Social History:  Marital Status:   [5]  Social History     Tobacco Use     Smoking status: Not on file     Smokeless tobacco: Not on file   Substance Use Topics     Alcohol use: Not on file     Drug use: Not on file        Medications:    acetaminophen (TYLENOL) 325 MG tablet  alum & mag hydroxide-simethicone (MAALOX MAX) 400-400-40 MG/5ML SUSP suspension  bacitracin 500 UNIT/GM OINT  bisacodyl (DULCOLAX) 10 MG suppository  clopidogrel (PLAVIX) 75 MG tablet  Coloplast barrier cream CREA  donepezil (ARICEPT) 5 MG tablet  guaiFENesin (ROBITUSSIN) 20 mg/mL SOLN solution  loperamide (IMODIUM) 1 MG/5ML liquid  loperamide (IMODIUM) 2 MG capsule  magnesium hydroxide (MILK OF MAGNESIA) 400 MG/5ML suspension  psyllium (METAMUCIL/KONSYL) 58.6 % powder  sodium phosphate (FLEET ENEMA) 7-19 GM/118ML rectal enema      Review of Systems  As mentioned above in the history present illness. All other systems were reviewed and are negative.    Physical Exam   BP: 126/76  Pulse: 90  Temp: 98.3  F (36.8  C)  Resp: 14  Weight: 46.3 kg (102 lb)  SpO2: 93 %      Physical Exam  Vitals and nursing note reviewed.   Constitutional:       General: She is not in acute distress.     Appearance: She is well-developed. She is not ill-appearing, toxic-appearing or diaphoretic.   HENT:      Head: Normocephalic and atraumatic.      Right Ear: External ear normal.      Left Ear: External ear normal.   Eyes:      General:         Right eye: No discharge.         Left eye: No discharge.      Conjunctiva/sclera: Conjunctivae normal.   Cardiovascular:      Rate and Rhythm: Normal rate and regular rhythm.      Heart sounds: Normal heart  sounds. No murmur heard.    No friction rub.   Pulmonary:      Effort: Pulmonary effort is normal. No respiratory distress.      Breath sounds: Normal breath sounds. No stridor. No wheezing or rales.   Chest:      Chest wall: No tenderness.   Abdominal:      General: Bowel sounds are normal.      Tenderness: There is no abdominal tenderness. There is no guarding.   Skin:     General: Skin is warm and dry.      Coloration: Skin is not pale.      Findings: Bruising (with skin tear noted over mid anterior shin , V shaped and measures 6 cm with underlying superficial hematoma.) present. No erythema or rash.   Neurological:      Mental Status: She is alert.   Psychiatric:         Mood and Affect: Mood normal.             ED Course                 Procedures    Skin tear was cleansed with saline, irrigated, and a small amount of blood clot was removed, skin tear was subsequently replaced, Vaseline gauze applied followed by Kerlix followed by Coban.  Patient tolerated the procedure well.  Skin tear measured 3 cm x 3 cm and was V-shaped. See photo.    No results found for this or any previous visit (from the past 24 hour(s)).    Medications - No data to display    Assessments & Plan (with Medical Decision Making)     I have reviewed the nursing notes.    I have reviewed the findings, diagnosis, plan and need for follow up with the patient.  Arpita Martinez is a 88 year old female with past medical history of dementia, MS, osteoporosis, frequent falls, on Plavix, currently living in assisted living, wheelchair-bound who presents to the emergency room via EMS with injury to right lower leg with bleeding with no known injury.  Patient denies any loss of consciousness, syncope.  Patient is a Jesus transfer lift every morning and evening to go from bed to chair.  HPI obtained from daughter and patient.  Per daughter, patient was transferred from Jesus lift to her wheelchair and per patient she was sitting and watching TV playing  and suddenly the other residents noted the patient's right lower leg bleeding through the pants.  EMS was called and patient was transferred here.  On exam patient has a skin tear that measures 6 cm with a hematoma noted under the skin tear.  The wound was cleansed and irrigated and a small amount of the hematoma was removed and the skin tear was reapproximated followed by Vaseline gauze, Kerlix, Coban.  No indication for antibiotics at this point.  Discussed with daughter that I recommend dressing changes starting on Wednesday daily and that this may take 2 to 4 weeks to fully heal.  Recommend follow-up visit with primary provider in 1 week.  Patient and daughter verbalized understanding and patient discharged in stable condition.    New Prescriptions    No medications on file       Final diagnoses:   Skin tear of right lower leg without complication, initial encounter - 6 cm V shaped flap with bruising       3/28/2022   [facility] EMERGENCY DEPT     Katherine Mccartney APRN CNP  03/28/22 1104

## 2022-03-28 NOTE — DISCHARGE INSTRUCTIONS
Leave the dressing on today and tomorrow.  I recommend replacing the dressing on Wednesday with the following instructions:  Wash the leg with water or sterile saline and pat dry and reapply Vaseline gauze followed by Kerlix followed by Coban lightly to keep in place.  The leg should remain elevated when up in the wheelchair.  These daily dressing changes continue until well healed or primary care provider changes the instructions.  I recommend follow-up with primary care provider in 1 week for reevaluation.  I recommend follow-up to the emergency room if sudden acute worsening in symptoms.

## 2022-03-28 NOTE — ED NOTES
Patient needed to be dressed for transport back to facility. Patient had soiled her depends. Patient's vaginal area was very pink. Spoke to Nuirse and barrier crean was applied by DENNIS Calderón. Patient was cleaned and dressed.

## 2022-03-28 NOTE — ED TRIAGE NOTES
Noted by staff to be bleeding from right leg, unsure how injury happened, on Plavix, wound wrapped with bleeding controlled

## 2022-05-05 ENCOUNTER — LAB REQUISITION (OUTPATIENT)
Dept: LAB | Facility: CLINIC | Age: 87
End: 2022-05-05
Payer: COMMERCIAL

## 2022-05-05 DIAGNOSIS — Z03.818 ENCOUNTER FOR OBSERVATION FOR SUSPECTED EXPOSURE TO OTHER BIOLOGICAL AGENTS RULED OUT: ICD-10-CM

## 2022-05-06 PROCEDURE — U0005 INFEC AGEN DETEC AMPLI PROBE: HCPCS | Mod: ORL | Performed by: INTERNAL MEDICINE

## 2022-05-07 LAB — SARS-COV-2 RNA RESP QL NAA+PROBE: NEGATIVE

## 2022-05-10 ENCOUNTER — LAB REQUISITION (OUTPATIENT)
Dept: LAB | Facility: CLINIC | Age: 87
End: 2022-05-10
Payer: COMMERCIAL

## 2022-05-10 DIAGNOSIS — Z03.818 ENCOUNTER FOR OBSERVATION FOR SUSPECTED EXPOSURE TO OTHER BIOLOGICAL AGENTS RULED OUT: ICD-10-CM

## 2022-05-16 PROCEDURE — U0005 INFEC AGEN DETEC AMPLI PROBE: HCPCS | Mod: ORL | Performed by: INTERNAL MEDICINE

## 2022-05-17 LAB — SARS-COV-2 RNA RESP QL NAA+PROBE: NEGATIVE

## 2022-05-19 ENCOUNTER — LAB REQUISITION (OUTPATIENT)
Dept: LAB | Facility: CLINIC | Age: 87
End: 2022-05-19
Payer: COMMERCIAL

## 2022-05-19 DIAGNOSIS — Z03.818 ENCOUNTER FOR OBSERVATION FOR SUSPECTED EXPOSURE TO OTHER BIOLOGICAL AGENTS RULED OUT: ICD-10-CM

## 2022-05-23 PROCEDURE — U0005 INFEC AGEN DETEC AMPLI PROBE: HCPCS | Mod: ORL | Performed by: INTERNAL MEDICINE

## 2022-05-24 LAB — SARS-COV-2 RNA RESP QL NAA+PROBE: NEGATIVE

## 2022-05-26 ENCOUNTER — LAB REQUISITION (OUTPATIENT)
Dept: LAB | Facility: CLINIC | Age: 87
End: 2022-05-26
Payer: COMMERCIAL

## 2022-05-26 DIAGNOSIS — Z03.818 ENCOUNTER FOR OBSERVATION FOR SUSPECTED EXPOSURE TO OTHER BIOLOGICAL AGENTS RULED OUT: ICD-10-CM

## 2022-05-31 PROCEDURE — U0005 INFEC AGEN DETEC AMPLI PROBE: HCPCS | Mod: ORL | Performed by: INTERNAL MEDICINE

## 2022-06-01 LAB — SARS-COV-2 RNA RESP QL NAA+PROBE: NEGATIVE

## 2022-06-02 ENCOUNTER — LAB REQUISITION (OUTPATIENT)
Dept: LAB | Facility: CLINIC | Age: 87
End: 2022-06-02
Payer: COMMERCIAL

## 2022-06-02 DIAGNOSIS — Z03.818 ENCOUNTER FOR OBSERVATION FOR SUSPECTED EXPOSURE TO OTHER BIOLOGICAL AGENTS RULED OUT: ICD-10-CM

## 2022-06-08 ENCOUNTER — LAB REQUISITION (OUTPATIENT)
Dept: LAB | Facility: CLINIC | Age: 87
End: 2022-06-08
Payer: COMMERCIAL

## 2022-06-08 DIAGNOSIS — Z03.818 ENCOUNTER FOR OBSERVATION FOR SUSPECTED EXPOSURE TO OTHER BIOLOGICAL AGENTS RULED OUT: ICD-10-CM

## 2022-06-09 ENCOUNTER — LAB REQUISITION (OUTPATIENT)
Dept: LAB | Facility: CLINIC | Age: 87
End: 2022-06-09
Payer: COMMERCIAL

## 2022-06-09 DIAGNOSIS — Z03.818 ENCOUNTER FOR OBSERVATION FOR SUSPECTED EXPOSURE TO OTHER BIOLOGICAL AGENTS RULED OUT: ICD-10-CM

## 2022-06-09 PROCEDURE — U0003 INFECTIOUS AGENT DETECTION BY NUCLEIC ACID (DNA OR RNA); SEVERE ACUTE RESPIRATORY SYNDROME CORONAVIRUS 2 (SARS-COV-2) (CORONAVIRUS DISEASE [COVID-19]), AMPLIFIED PROBE TECHNIQUE, MAKING USE OF HIGH THROUGHPUT TECHNOLOGIES AS DESCRIBED BY CMS-2020-01-R: HCPCS | Mod: ORL | Performed by: INTERNAL MEDICINE

## 2022-06-10 LAB — SARS-COV-2 RNA RESP QL NAA+PROBE: NEGATIVE

## 2022-06-13 PROCEDURE — U0003 INFECTIOUS AGENT DETECTION BY NUCLEIC ACID (DNA OR RNA); SEVERE ACUTE RESPIRATORY SYNDROME CORONAVIRUS 2 (SARS-COV-2) (CORONAVIRUS DISEASE [COVID-19]), AMPLIFIED PROBE TECHNIQUE, MAKING USE OF HIGH THROUGHPUT TECHNOLOGIES AS DESCRIBED BY CMS-2020-01-R: HCPCS | Mod: ORL | Performed by: INTERNAL MEDICINE

## 2022-06-14 LAB — SARS-COV-2 RNA RESP QL NAA+PROBE: NEGATIVE

## 2022-08-02 ENCOUNTER — LAB REQUISITION (OUTPATIENT)
Dept: LAB | Facility: CLINIC | Age: 87
End: 2022-08-02
Payer: COMMERCIAL

## 2022-08-02 DIAGNOSIS — Z03.818 ENCOUNTER FOR OBSERVATION FOR SUSPECTED EXPOSURE TO OTHER BIOLOGICAL AGENTS RULED OUT: ICD-10-CM

## 2022-08-03 PROCEDURE — U0003 INFECTIOUS AGENT DETECTION BY NUCLEIC ACID (DNA OR RNA); SEVERE ACUTE RESPIRATORY SYNDROME CORONAVIRUS 2 (SARS-COV-2) (CORONAVIRUS DISEASE [COVID-19]), AMPLIFIED PROBE TECHNIQUE, MAKING USE OF HIGH THROUGHPUT TECHNOLOGIES AS DESCRIBED BY CMS-2020-01-R: HCPCS | Mod: ORL | Performed by: INTERNAL MEDICINE

## 2022-08-04 LAB — SARS-COV-2 RNA RESP QL NAA+PROBE: NEGATIVE

## 2022-08-08 ENCOUNTER — LAB REQUISITION (OUTPATIENT)
Dept: LAB | Facility: CLINIC | Age: 87
End: 2022-08-08
Payer: COMMERCIAL

## 2022-08-08 DIAGNOSIS — Z03.818 ENCOUNTER FOR OBSERVATION FOR SUSPECTED EXPOSURE TO OTHER BIOLOGICAL AGENTS RULED OUT: ICD-10-CM

## 2022-08-09 LAB — SARS-COV-2 RNA RESP QL NAA+PROBE: NEGATIVE

## 2022-08-09 PROCEDURE — U0003 INFECTIOUS AGENT DETECTION BY NUCLEIC ACID (DNA OR RNA); SEVERE ACUTE RESPIRATORY SYNDROME CORONAVIRUS 2 (SARS-COV-2) (CORONAVIRUS DISEASE [COVID-19]), AMPLIFIED PROBE TECHNIQUE, MAKING USE OF HIGH THROUGHPUT TECHNOLOGIES AS DESCRIBED BY CMS-2020-01-R: HCPCS | Mod: ORL | Performed by: INTERNAL MEDICINE

## 2022-08-12 ENCOUNTER — LAB REQUISITION (OUTPATIENT)
Dept: LAB | Facility: CLINIC | Age: 87
End: 2022-08-12
Payer: COMMERCIAL

## 2022-08-12 DIAGNOSIS — Z03.818 ENCOUNTER FOR OBSERVATION FOR SUSPECTED EXPOSURE TO OTHER BIOLOGICAL AGENTS RULED OUT: ICD-10-CM

## 2022-08-16 PROCEDURE — U0005 INFEC AGEN DETEC AMPLI PROBE: HCPCS | Mod: ORL | Performed by: INTERNAL MEDICINE

## 2022-08-17 LAB — SARS-COV-2 RNA RESP QL NAA+PROBE: NEGATIVE

## 2022-12-27 ENCOUNTER — LAB REQUISITION (OUTPATIENT)
Dept: LAB | Facility: CLINIC | Age: 87
End: 2022-12-27
Payer: COMMERCIAL

## 2022-12-27 DIAGNOSIS — Z03.818 ENCOUNTER FOR OBSERVATION FOR SUSPECTED EXPOSURE TO OTHER BIOLOGICAL AGENTS RULED OUT: ICD-10-CM

## 2023-01-01 ENCOUNTER — LAB REQUISITION (OUTPATIENT)
Dept: LAB | Facility: CLINIC | Age: 88
End: 2023-01-01
Payer: COMMERCIAL

## 2023-01-01 DIAGNOSIS — F01.50 VASCULAR DEMENTIA, UNSPECIFIED SEVERITY, WITHOUT BEHAVIORAL DISTURBANCE, PSYCHOTIC DISTURBANCE, MOOD DISTURBANCE, AND ANXIETY (H): ICD-10-CM

## 2023-01-01 DIAGNOSIS — D64.9 ANEMIA, UNSPECIFIED: ICD-10-CM

## 2023-01-01 DIAGNOSIS — R73.9 HYPERGLYCEMIA, UNSPECIFIED: ICD-10-CM

## 2023-01-01 LAB
ALBUMIN SERPL BCG-MCNC: 3.3 G/DL (ref 3.5–5.2)
ALP SERPL-CCNC: 84 U/L (ref 35–104)
ALT SERPL W P-5'-P-CCNC: <5 U/L (ref 0–50)
ANION GAP SERPL CALCULATED.3IONS-SCNC: 20 MMOL/L (ref 7–15)
AST SERPL W P-5'-P-CCNC: 25 U/L (ref 0–45)
BILIRUB SERPL-MCNC: 0.6 MG/DL
BUN SERPL-MCNC: 21.3 MG/DL (ref 8–23)
CALCIUM SERPL-MCNC: 8.8 MG/DL (ref 8.2–9.6)
CHLORIDE SERPL-SCNC: 102 MMOL/L (ref 98–107)
CREAT SERPL-MCNC: 0.45 MG/DL (ref 0.51–0.95)
DEPRECATED HCO3 PLAS-SCNC: 18 MMOL/L (ref 22–29)
EGFRCR SERPLBLD CKD-EPI 2021: >90 ML/MIN/1.73M2
ERYTHROCYTE [DISTWIDTH] IN BLOOD BY AUTOMATED COUNT: 14.3 % (ref 10–15)
GLUCOSE SERPL-MCNC: 208 MG/DL (ref 70–99)
HBA1C MFR BLD: 6.5 %
HCT VFR BLD AUTO: 46.6 % (ref 35–47)
HGB BLD-MCNC: 15 G/DL (ref 11.7–15.7)
MCH RBC QN AUTO: 30.5 PG (ref 26.5–33)
MCHC RBC AUTO-ENTMCNC: 32.2 G/DL (ref 31.5–36.5)
MCV RBC AUTO: 95 FL (ref 78–100)
PLATELET # BLD AUTO: 269 10E3/UL (ref 150–450)
POTASSIUM SERPL-SCNC: 3.9 MMOL/L (ref 3.4–5.3)
PROT SERPL-MCNC: 6.4 G/DL (ref 6.4–8.3)
RBC # BLD AUTO: 4.92 10E6/UL (ref 3.8–5.2)
SODIUM SERPL-SCNC: 140 MMOL/L (ref 135–145)
WBC # BLD AUTO: 5.1 10E3/UL (ref 4–11)

## 2023-01-01 PROCEDURE — 36415 COLL VENOUS BLD VENIPUNCTURE: CPT | Mod: ORL | Performed by: NURSE PRACTITIONER

## 2023-01-01 PROCEDURE — 85027 COMPLETE CBC AUTOMATED: CPT | Mod: ORL | Performed by: NURSE PRACTITIONER

## 2023-01-01 PROCEDURE — P9604 ONE-WAY ALLOW PRORATED TRIP: HCPCS | Mod: ORL | Performed by: NURSE PRACTITIONER

## 2023-01-01 PROCEDURE — 80053 COMPREHEN METABOLIC PANEL: CPT | Mod: ORL | Performed by: NURSE PRACTITIONER

## 2023-01-01 PROCEDURE — 83036 HEMOGLOBIN GLYCOSYLATED A1C: CPT | Mod: ORL | Performed by: NURSE PRACTITIONER
